# Patient Record
Sex: MALE
[De-identification: names, ages, dates, MRNs, and addresses within clinical notes are randomized per-mention and may not be internally consistent; named-entity substitution may affect disease eponyms.]

---

## 2017-12-25 ENCOUNTER — TRANSCRIPTION ENCOUNTER (OUTPATIENT)
Age: 67
End: 2017-12-25

## 2018-10-24 PROBLEM — Z00.00 ENCOUNTER FOR PREVENTIVE HEALTH EXAMINATION: Status: ACTIVE | Noted: 2018-10-24

## 2018-11-02 ENCOUNTER — APPOINTMENT (OUTPATIENT)
Dept: ORTHOPEDIC SURGERY | Facility: CLINIC | Age: 68
End: 2018-11-02

## 2019-12-02 ENCOUNTER — APPOINTMENT (OUTPATIENT)
Dept: ORTHOPEDIC SURGERY | Facility: CLINIC | Age: 69
End: 2019-12-02
Payer: COMMERCIAL

## 2019-12-02 DIAGNOSIS — M67.471 GANGLION, RIGHT ANKLE AND FOOT: ICD-10-CM

## 2019-12-02 DIAGNOSIS — Z78.9 OTHER SPECIFIED HEALTH STATUS: ICD-10-CM

## 2019-12-02 DIAGNOSIS — M84.374A STRESS FRACTURE, RIGHT FOOT, INITIAL ENCOUNTER FOR FRACTURE: ICD-10-CM

## 2019-12-02 PROCEDURE — 99204 OFFICE O/P NEW MOD 45 MIN: CPT

## 2019-12-02 PROCEDURE — 73630 X-RAY EXAM OF FOOT: CPT | Mod: RT

## 2019-12-02 PROCEDURE — 72040 X-RAY EXAM NECK SPINE 2-3 VW: CPT

## 2019-12-02 NOTE — PHYSICAL EXAM
[de-identified] : General appearance: well nourished and hydrated, pleasant, alert and oriented x 3, cooperative.  \par         HEENT Normocephalic, EOM intact, Nasal septum midline, Oral cavity clear, External auditory canal clear.  \par         Cardiovascular: no lower leg edema, moderate varicosities bilateral feet, pedal pulses are palpable.  \par         Lymphatics Lymph nodes: none palpated, Lymphedema: not present.  \par         Neurologic: sensation is normal, no muscle weakness in upper or lower extremities, patella and biceps tendon reflexes intact and symmetric. Young's sign negative bilaterally, negative inverted brachioradialis reflex, negative clonus.\par         Dermatologic no apparent skin lesions, moist, warm, no rash.  \par         Spine: cervical spine appears normal with limited range of terminal extension and left sided rotation, thoracic spine appears normal and moves freely, lumbosacral spine appears normal and moves freely.  Spine midline and paraspinal muscles are nontender to palpation.\par         Gait: normal  \par \par Right foot\par         Inspection: color, texture and turgor are normal.  Approximately [id number] well circumscribed, non-matted, partially compressible, minimally mobile mass at anterolateral ankle joint line.\par         ROM: full range of motion of all joints without pain or crepitus.  No pain with resisted ROM of PT/PL/PB/TA.\par         Palpation: no tenderness at anterolateral ankle mass. Tender at base of 5th metatarsal. Otherwise nontender.\par         Strength: 5/5 all planes without pain.\par         Stability: no instability noted.\par  [de-identified] : 3 views of the right foot and 2 views of the cervical spine were taken today. \par \par Right foot: There is mild arthritic change present in the right ankle. The hindfoot and midfoot joints appear normal. There is no displaced fracture. Flexion deformity noted at 1st IPJ; otherwise no significant deformity.\par \par Cervical spine: normal alignment. Spondylosis noted at C5-7.

## 2019-12-02 NOTE — DISCUSSION/SUMMARY
[de-identified] : 70y/o male with right ankle ganglion cyst, presumed stress fracture base of right 5th metatarsal, cervical spondylosis\par - Discussed natural history of these conditions and reviewed treatment options. \par - The ganglion is nonpainful and he elected not to have it aspirated; will observe for now. \par - The stress fracture is minimally symptomatic and should improve with rest and time. I recommended hard soled shoewear and to stop running for the next few weeks.\par - PT for cervical strengthening and ROM\par - Meloxicam x30d; transition to OTC NSAIDs on PRN basis following\par - RTC 4-6 weeks for re-evaluation. Consider R foot MRI if not improved.

## 2019-12-02 NOTE — HISTORY OF PRESENT ILLNESS
[de-identified] : 68y/o male presenting for evaluation of a right foot mass, right foot pain, neck stiffness, and neck pain. He thinks that he first noticed the foot issues about a week ago without history of trauma or other inciting event. There are two separate issues: a mass at the anterolateral aspect of the ankle which is nonpainful, and pain at the lateral edge of his foot. The mass is new as far as he can recall. The pain is dull and achy, 1/10 intensity, and thus far has not stopped him from running for exercise. \par \par Regarding the neck, he is complaining of pain and stiffness in the muscles of both sides of the neck associated with motion. No pain at rest. He denies any radiation of pain down the arms or legs. No numbness/tingling. Denies finger clumsiness or gait disturbances. No issues bowel/bladder.\par \par Patient was referred by Carolyn Newman. He works as an executive of a healthcare industry company. He relates a history of gout that usually affects him in the bilateral first MTPs.

## 2020-01-22 ENCOUNTER — APPOINTMENT (OUTPATIENT)
Dept: ORTHOPEDIC SURGERY | Facility: CLINIC | Age: 70
End: 2020-01-22
Payer: COMMERCIAL

## 2020-01-22 VITALS — WEIGHT: 225 LBS | HEIGHT: 73 IN | BODY MASS INDEX: 29.82 KG/M2

## 2020-01-22 DIAGNOSIS — M47.812 SPONDYLOSIS W/OUT MYELOPATHY OR RADICULOPATHY, CERVICAL REGION: ICD-10-CM

## 2020-01-22 DIAGNOSIS — R22.41 LOCALIZED SWELLING, MASS AND LUMP, RIGHT LOWER LIMB: ICD-10-CM

## 2020-01-22 PROCEDURE — 99214 OFFICE O/P EST MOD 30 MIN: CPT | Mod: 25

## 2020-01-22 PROCEDURE — 20612 ASPIRATE/INJ GANGLION CYST: CPT | Mod: RT

## 2020-01-22 RX ORDER — MELOXICAM 15 MG/1
15 TABLET ORAL DAILY
Qty: 30 | Refills: 0 | Status: ACTIVE | COMMUNITY
Start: 2019-12-02 | End: 1900-01-01

## 2020-01-22 NOTE — PROCEDURE
[FreeTextEntry1] : The dorsal foot mass was anesthetized with ethyl chloride spray and prepped with Betadine and alcohol. An 18 gauge needle was used at the midpoint of the mass. Septations could be appreciated with deeper penetration into the mass. Aspiration was attempted but was completely dry. The needle was removed and a band-aid was applied. The patient tolerated the procedure well without complications.

## 2020-01-22 NOTE — PHYSICAL EXAM
[de-identified] : General appearance: well nourished and hydrated, pleasant, alert and oriented x 3, cooperative.  \par HEENT: normocephalic, EOM intact, nasal septum midline, oral cavity clear, external auditory canal clear.  \par Cardiovascular: no lower leg edema, no varicosities, dorsalis pedis pulses palpable and symmetric.  \par Lymphatics: no palpable lymphadenopathy, no lymphedema.  \par Neurologic: sensation is normal, no muscle weakness in upper or lower extremities, patella tendon reflexes present and symmetric.  \par Dermatologic: skin moist, warm, no rash.  \par Spine: cervical spine with reduced lordosis and stiff uncomfortable range of motion particularly in extension and left rotation, thoracic spine with normal kyphosis and painless range of motion, lumbosacral spine with normal lordosis and painless range of motion.\par Gait: normal.  \par \par Right foot\par - Inspection: negative swelling, ecchymosis, and erythema.  [id number] well circumscribed, non-matted, firm, noncompressible, minimally mobile mass just distal to dorsolateral ankle joint line.\par - Wounds: none.  \par - ROM: full range of motion of all joints without pain or crepitus.  \par - Palpation: mild tenderness on firm palpation of the mass. No tenderness elsewhere.  \par - Strength: 5/5 PB/PL/PT/TA/FHL/EHL.\par - Stability: no instability noted.

## 2020-01-22 NOTE — HISTORY OF PRESENT ILLNESS
[de-identified] : 68y/o male following up for cervical spondylosis, right foot mass, and right 5th metatarsal stress fracture. I last saw him on 12/2/19 at which time we began PT, meloxicam, and activity modification. The cervical pain and stiffness is the same. However, he only attended two sessions of PT and has not been taking any medication. No worsening of neck symptoms. He denies hand/finger clumsiness, leg weakness/incoordination, and bowel/bladder changes. The right foot mass has become more symptomatic and he would like to have it addressed. The lateral foot pain that was bothering him last visit has completely resolved. He has not yet returned to running.

## 2020-01-29 ENCOUNTER — LABORATORY RESULT (OUTPATIENT)
Age: 70
End: 2020-01-29

## 2020-01-30 ENCOUNTER — APPOINTMENT (OUTPATIENT)
Dept: ORTHOPEDIC SURGERY | Facility: CLINIC | Age: 70
End: 2020-01-30
Payer: COMMERCIAL

## 2020-01-30 ENCOUNTER — LABORATORY RESULT (OUTPATIENT)
Age: 70
End: 2020-01-30

## 2020-01-30 VITALS
DIASTOLIC BLOOD PRESSURE: 91 MMHG | BODY MASS INDEX: 29.82 KG/M2 | HEIGHT: 73 IN | HEART RATE: 76 BPM | WEIGHT: 225 LBS | SYSTOLIC BLOOD PRESSURE: 137 MMHG

## 2020-01-30 DIAGNOSIS — R22.41 LOCALIZED SWELLING, MASS AND LUMP, RIGHT LOWER LIMB: ICD-10-CM

## 2020-01-30 PROCEDURE — 99214 OFFICE O/P EST MOD 30 MIN: CPT | Mod: 25

## 2020-01-30 PROCEDURE — 10005 FNA BX W/US GDN 1ST LES: CPT | Mod: RT

## 2020-01-30 NOTE — DISCUSSION/SUMMARY
[All Questions Answered] : Patient (and family) had all questions answered to an agreeable level of satisfaction [Interested in Proceeding] : Patient (and family) expressed understanding and interest in proceeding with the plan as outlined [de-identified] : Patient has a nonspecific soft tissue mass in the anterolateral portion of the RIGHT ankle. Cartilaginous is done today. This was sent for culture and pathology. I will see him in one week to have a we will plan for possible resection.\par \par If imaging was ordered, the patient was told to make an appointment to review findings right after all imaging is completed.\par \par We discussed risks, benefits and alternatives. Rationale of care was reviewed and all questions were answered. Patient (and family) had all questions answered to her degree of the level of satisfaction. Patient (and family) expressed understanding and interest in proceeding with the plan as outlined.\par \par \par \par \par This note was done with a voice recognition transcription software and any typos are related to this rather than medical error.

## 2020-01-30 NOTE — DATA REVIEWED
[Imaging Present] : Present [de-identified] : MRI scan of the RIGHT ankle from degenerative joint it is 19 she has a 2 x 2 by 2 cm nodular mass along the anterolateral aspect of the RIGHT ankle. This was thought possibly to be malignant or benign.\par \par Focused ultrasound of the area shows a lesion below the skin at the area the fascia it is 2 x 2 centimeters. There was no obvious internal flow.

## 2020-01-30 NOTE — HISTORY OF PRESENT ILLNESS
[FreeTextEntry1] : This is a 69-year-old gentleman complains of increasing size anterolateral RIGHT ankle mass for approximately 2-1/2 or 3 months. He does diameter and inciting event. Does cause minimsal pain. She says most of time it does not bother him however occasionally will cause pain. He wanted to have it out. He had an MRI which showed a question of a lesion sent to me for further evaluation. [___ mths] : [unfilled] month(s) ago [Worsening] : worsening [2] : currently ~his/her~ pain is 2 out of 10 [Intermit.] : ~He/She~ states the symptoms seem to be intermittent [Bending] : worsened by bending [Direct Pressure] : worsened by direct pressure

## 2020-01-30 NOTE — PHYSICAL EXAM
[General Appearance - Well-Appearing] : Well appearing [FreeTextEntry1] : On exam, the patient stands and good balance. He is able to walk properly. This is above the shoe counter on the RIGHT side anterolaterally. He has a 2 x 2 cm mass. It is mobile. There is minimal Tinel's associated with it. It is nonpulsatile. He is able to move with good range of motion of his knee ankle and foot. He has no lymphadenopathy behind the knee or in the groin. [General Appearance - Well Nourished] : well nourished [Oriented To Time, Place, And Person] : Oriented to person, place, and time [Impaired Insight] : Insight and judgment were intact [Affect] : The affect was normal. [Mood] : the mood was normal [Sclera] : the sclera and conjunctiva were normal [Neck Cervical Mass (___cm)] : no neck mass was observed [Heart Rate And Rhythm] : heart rate was normal and rhythm regular [] : No respiratory distress [Abdomen Soft] : Soft [Normal Station and Gait] : gait and station were normal [Tenderness] : no tenderness [Swelling] : swelling [Skin Changes - Describe changes:] : No skin changes noted [Masses] : masses [Full ROM Unless otherwise noted:] : Full range of motion unless otherwise noted: [LE  Motor Strength Normal unless otherwise noted:] : 5/5 strength in bilateral lower extemities unless otherwise noted. [Normal] : Sensation intact to light touch. [2+] : left 2+

## 2020-01-30 NOTE — PROCEDURE
[Mass ___ (size, type of)] :  [unfilled] Mass [Patient] : patient [Risk] : Risk [Bleeding] : bleeding [Alcohol] : Alcohol [Verbal Consent Obtained] : verbal consent was obtained prior to the procedure [Ethyl Chloride Spray] : ethyl chloride spray was used as a topical anesthetic [Betadine] : Betadine [1%] : 1% lidocaine [___mL] : [unfilled] ~L [Needle Gauge___] : Local anesthetic was administered using a [unfilled]-gauge needle [Anterior] : anterior [Direct] : direct [Trucut] : Trucut needle [Culture] : culture [Cores] : cores [Tolerated Well] : the patient tolerated the procedure well [None] : None [de-identified] : RIGHT ankle mass

## 2020-01-30 NOTE — REVIEW OF SYSTEMS
[Feeling Tired] : not feeling tired [Arthralgias] : no arthralgias [Joint Pain] : no joint pain [Joint Stiffness] : no joint stiffness [Nl] : Hematologic/Lymphatic

## 2020-02-18 ENCOUNTER — APPOINTMENT (OUTPATIENT)
Dept: ORTHOPEDIC SURGERY | Facility: CLINIC | Age: 70
End: 2020-02-18
Payer: COMMERCIAL

## 2020-02-18 DIAGNOSIS — C49.9 MALIGNANT NEOPLASM OF CONNECTIVE AND SOFT TISSUE, UNSPECIFIED: ICD-10-CM

## 2020-02-18 PROCEDURE — 99214 OFFICE O/P EST MOD 30 MIN: CPT

## 2020-02-27 PROBLEM — C49.9 SOFT TISSUE SARCOMA: Status: ACTIVE | Noted: 2020-02-18

## 2020-02-27 NOTE — HISTORY OF PRESENT ILLNESS
[Stable] : stable [FreeTextEntry1] : Patient is back at this point.  He has no new problems.  He is here to review his pathology.  He has no problems after the biopsy.

## 2020-02-27 NOTE — DATA REVIEWED
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  This was reviewed at [facility]

## 2020-02-27 NOTE — REVIEW OF SYSTEMS
[Nl] : Hematologic/Lymphatic [Feeling Tired] : not feeling tired [Arthralgias] : no arthralgias [Joint Pain] : no joint pain [Joint Stiffness] : no joint stiffness

## 2020-02-27 NOTE — PHYSICAL EXAM
[FreeTextEntry1] : On exam the patient is as he was before.  The incision site is clean dry and intact.  He has no ecchymosis in the area.  The lesion is relatively unchanged but still mildly painful.  He remains neurovascularly intact [General Appearance - Well-Appearing] : Well appearing [General Appearance - Well Nourished] : well nourished [Normal Station and Gait] : gait and station were normal [Tenderness] : no tenderness [Swelling] : swelling [Ecchymosis] : no ecchymosis [Masses] : masses [Skin Changes - Describe changes:] : No skin changes noted [Full ROM Unless otherwise noted:] : Full range of motion unless otherwise noted: [LE  Motor Strength Normal unless otherwise noted:] : 5/5 strength in bilateral lower extemities unless otherwise noted. [Normal] : Sensation intact to light touch. [2+] : left 2+

## 2020-02-27 NOTE — DISCUSSION/SUMMARY
[All Questions Answered] : Patient (and family) had all questions answered to an agreeable level of satisfaction [Interested in Proceeding] : Patient (and family) expressed understanding and interest in proceeding with the plan as outlined [de-identified] : I discussed at length with the patient the findings of high-grade sarcoma.  Because of its size he would benefit from a local wide excision.  Depending on margin status as they will most likely be close he may or may not benefit from radiation postoperatively.  In the meantime I would feel most comfortable staging him with a PET/CT for initial staging.  Following this we can then decide whether he should go for radiation consultation versus early surgery with radiation discussion afterwards.  We discussed that there is a risk of this being elsewhere in his body and this is why we need to first stage him before surgery.  He is deciding whether he wants his treatment done at [facility] versus with us.  In the meantime we will order the PET/CT while he is deciding.  Should he have his care done at [facility] they will most likely want to do their own studies there.\par \par If imaging was ordered, the patient was told to make an appointment to review findings right after all imaging is completed.\par \par We discussed risks, benefits and alternatives. Rationale of care was reviewed and all questions were answered. Patient (and family) had all questions answered to her degree of the level of satisfaction. Patient (and family) expressed understanding and interest in proceeding with the plan as outlined.\par \par \par \par \par This note was done with a voice recognition transcription software and any typos are related to this rather than medical error. Surgical risks reviewed. Patient (and family) had all questions answered to an agreeable level of satisfaction. Patient (and family) expressed understanding and interest in proceeding with the plan as outlined.  \par

## 2020-03-16 ENCOUNTER — APPOINTMENT (OUTPATIENT)
Dept: ORTHOPEDIC SURGERY | Facility: CLINIC | Age: 70
End: 2020-03-16